# Patient Record
Sex: FEMALE | Race: WHITE | Employment: OTHER | URBAN - METROPOLITAN AREA
[De-identification: names, ages, dates, MRNs, and addresses within clinical notes are randomized per-mention and may not be internally consistent; named-entity substitution may affect disease eponyms.]

---

## 2017-05-18 ENCOUNTER — OP HISTORICAL/CONVERTED ENCOUNTER (OUTPATIENT)
Dept: OTHER | Age: 62
End: 2017-05-18

## 2020-12-16 ENCOUNTER — OFFICE VISIT (OUTPATIENT)
Dept: OBGYN CLINIC | Age: 65
End: 2020-12-16
Payer: COMMERCIAL

## 2020-12-16 VITALS
WEIGHT: 124 LBS | HEIGHT: 60 IN | DIASTOLIC BLOOD PRESSURE: 86 MMHG | BODY MASS INDEX: 24.35 KG/M2 | SYSTOLIC BLOOD PRESSURE: 134 MMHG

## 2020-12-16 DIAGNOSIS — Z12.72 SCREENING FOR MALIGNANT NEOPLASM OF VAGINA AFTER TOTAL HYSTERECTOMY: ICD-10-CM

## 2020-12-16 DIAGNOSIS — Z01.419 GYNECOLOGIC EXAM NORMAL: Primary | ICD-10-CM

## 2020-12-16 DIAGNOSIS — Z12.31 VISIT FOR SCREENING MAMMOGRAM: Primary | ICD-10-CM

## 2020-12-16 DIAGNOSIS — Z90.710 SCREENING FOR MALIGNANT NEOPLASM OF VAGINA AFTER TOTAL HYSTERECTOMY: ICD-10-CM

## 2020-12-16 PROCEDURE — 77067 SCR MAMMO BI INCL CAD: CPT | Performed by: OBSTETRICS & GYNECOLOGY

## 2020-12-16 PROCEDURE — 77063 BREAST TOMOSYNTHESIS BI: CPT | Performed by: OBSTETRICS & GYNECOLOGY

## 2020-12-16 PROCEDURE — 99386 PREV VISIT NEW AGE 40-64: CPT | Performed by: OBSTETRICS & GYNECOLOGY

## 2020-12-16 NOTE — PROGRESS NOTES
Risa Rios is a 60 y.o. female, No obstetric history on file., No LMP recorded. Patient has had a hysterectomy. , who presents today for the following:  Chief Complaint   Patient presents with    Annual Exam        Allergies   Allergen Reactions    Amoxicillin Other (comments)     Feels bad       Current Outpatient Medications   Medication Sig    NEBIVOLOL HCL (BYSTOLIC PO) Take  by mouth.  lisinopril-hydrochlorothiazide (PRINZIDE) 10-12.5 mg per tablet Take  by mouth daily.  calcium-vitamin D (CALCIUM+D) 500 mg(1,250mg) -200 unit per tablet Take 1 Tab by mouth two (2) times daily (with meals). No current facility-administered medications for this visit.         Past Medical History:   Diagnosis Date    Essential hypertension     Hyperlipidemia        Past Surgical History:   Procedure Laterality Date    HX PARTIAL HYSTERECTOMY         Family History   Problem Relation Age of Onset    Hypertension Mother     Heart Attack Brother     High Cholesterol Brother     Hypertension Brother        Social History     Socioeconomic History    Marital status:      Spouse name: Not on file    Number of children: Not on file    Years of education: Not on file    Highest education level: Not on file   Occupational History    Not on file   Social Needs    Financial resource strain: Not on file    Food insecurity     Worry: Not on file     Inability: Not on file    Transportation needs     Medical: Not on file     Non-medical: Not on file   Tobacco Use    Smoking status: Never Smoker    Smokeless tobacco: Never Used   Substance and Sexual Activity    Alcohol use: No    Drug use: No    Sexual activity: Yes     Partners: Male   Lifestyle    Physical activity     Days per week: Not on file     Minutes per session: Not on file    Stress: Not on file   Relationships    Social connections     Talks on phone: Not on file     Gets together: Not on file     Attends Holiness service: Not on file     Active member of club or organization: Not on file     Attends meetings of clubs or organizations: Not on file     Relationship status: Not on file    Intimate partner violence     Fear of current or ex partner: Not on file     Emotionally abused: Not on file     Physically abused: Not on file     Forced sexual activity: Not on file   Other Topics Concern    Not on file   Social History Narrative    Not on file         HPI  Annual    Review of Systems   Constitutional: Negative. Respiratory: Negative. Cardiovascular: Negative. Gastrointestinal: Negative. Genitourinary: Negative. Musculoskeletal: Negative. Skin: Negative. Neurological: Negative. Endo/Heme/Allergies: Negative. Psychiatric/Behavioral: Negative. All other systems reviewed and are negative. /86 (BP 1 Location: Left arm, BP Patient Position: Sitting)   Ht 5' (1.524 m)   Wt 124 lb (56.2 kg)   BMI 24.22 kg/m²    OBGyn Exam   Constitutional:     General Appearance: healthy-appearing, well-nourished, and well-developed;. Level of Distress: NAD. Ambulation: ambulating normally. Psychiatric:   Insight: good judgement. Mental Status: normal mood and affect and active and alert. Orientation: to time, place, and person. Memory: recent memory normal and remote memory normal.     Head: Head: normocephalic and atraumatic. Neck:   Neck: supple, FROM, trachea midline, and no masses. Lymph Nodes: no cervical LAD, supraclavicular LAD, axillary LAD, or inguinal LAD. Thyroid: no enlargement or nodules and non-tender. Lungs:   Respiratory effort: no dyspnea. Cardiovascular:     Pulses including femoral / pedal: normal throughout. Breast: Breast: no masses or abnormal secretions and normal appearance. Abdomen:    no tenderness, guarding, masses, rebound tenderness, or CVA tenderness and non-distended.      Female :   External genitalia: no lesions or rash and normal.   Vagina: moist mucosa;    Cervix: absent  Uterus: absent   Adnexae: no adnexal mass or tenderness and size WNL. Bladder and Urethra: normal bladder and urethra (except where noted). Musculoskeletal[de-identified]   Motor Strength and Tone: normal tone and motor strength. Joints, Bones, and Muscles: no contractures, malalignment, tenderness, or bony abnormalities and normal movement of all extremities. Extremities: no cyanosis, edema, varicosities, or palpable cord. Skin:   Inspection and palpation: no rash, lesions, ulcer, induration, nodules, jaundice, or abnormal nevi and good turgor. Nails: normal.     Back: Thoracolumbar Appearance: normal curvature. 1. Gynecologic exam normal    - PAP, LB, RFX HPV MRIQQ([id number]); Future    2.  Screening for malignant neoplasm of vagina after total hysterectomy          Follow-up and Dispositions    · Return in about 1 year (around 12/16/2021) for annual.

## 2020-12-21 LAB
CYTOLOGIST CVX/VAG CYTO: NORMAL
CYTOLOGY CVX/VAG DOC CYTO: NORMAL
DX ICD CODE: NORMAL
LABCORP, 190119: NORMAL
Lab: NORMAL
Lab: NORMAL
OTHER STN SPEC: NORMAL
STAT OF ADQ CVX/VAG CYTO-IMP: NORMAL

## 2021-11-30 ENCOUNTER — TRANSCRIBE ORDER (OUTPATIENT)
Dept: REGISTRATION | Age: 66
End: 2021-11-30

## 2021-11-30 ENCOUNTER — HOSPITAL ENCOUNTER (OUTPATIENT)
Dept: GENERAL RADIOLOGY | Age: 66
Discharge: HOME | End: 2021-11-30
Payer: COMMERCIAL

## 2021-11-30 DIAGNOSIS — I26.99 PULMONARY EMBOLISM (HCC): Primary | ICD-10-CM

## 2021-11-30 DIAGNOSIS — I26.99 PULMONARY EMBOLISM (HCC): ICD-10-CM

## 2021-11-30 PROCEDURE — 71046 X-RAY EXAM CHEST 2 VIEWS: CPT

## 2022-03-17 ENCOUNTER — TRANSCRIBE ORDER (OUTPATIENT)
Dept: SCHEDULING | Age: 67
End: 2022-03-17

## 2022-03-17 DIAGNOSIS — I10 HTN (HYPERTENSION): Primary | ICD-10-CM

## 2022-03-22 ENCOUNTER — HOSPITAL ENCOUNTER (OUTPATIENT)
Dept: NON INVASIVE DIAGNOSTICS | Age: 67
Discharge: HOME | End: 2022-03-22
Attending: FAMILY MEDICINE
Payer: MEDICARE

## 2022-03-22 DIAGNOSIS — I10 UNCONTROLLED HYPERTENSION: ICD-10-CM

## 2022-03-22 PROCEDURE — 93975 VASCULAR STUDY: CPT

## 2022-03-24 LAB
ABDOMINAL MID AORTA VEL: 110 CM/S
LEFT KIDNEY ARCUATE ARTERY INFERIOR EDV: 1.3 CM/S
LEFT KIDNEY ARCUATE ARTERY INFERIOR PSV: 24.4 CM/S
LEFT KIDNEY ARCUATE ARTERY MEDIAL EDV: 6.4 CM/S
LEFT KIDNEY ARCUATE ARTERY MEDIAL PSV: 27.9 CM/S
LEFT KIDNEY ARCUATE ARTERY SUPERIOR EDV: 4.6 CM/S
LEFT KIDNEY ARCUATE ARTERY SUPERIOR PSV: 21.4 CM/S
LEFT KIDNEY INF ARCUATE RI: 0.95
LEFT KIDNEY LENGTH: 9.33 CM
LEFT KIDNEY MED ARCUATE RI: 0.77
LEFT KIDNEY SUP ARCUATE RI: 0.79
LEFT RENAL DIST DIAS: 37.7 CM/S
LEFT RENAL DIST RI: 0.77
LEFT RENAL DIST SYS: 165 CM/S
LEFT RENAL LOWER PARENCHYMA MAX: 39.9 CM/S
LEFT RENAL LOWER PARENCHYMA MIN: 9 CM/S
LEFT RENAL LOWER PARENCHYMA RI: 0.77
LEFT RENAL MID DIAS: 27 CM/S
LEFT RENAL MID RI: 0.81
LEFT RENAL MID SYS: 139 CM/S
LEFT RENAL MIDDLE PARENCHYMA MAX: 41.2 CM/S
LEFT RENAL MIDDLE PARENCHYMA MIN: 6.9 CM/S
LEFT RENAL MIDDLE PARENCHYMA RI: 0.83
LEFT RENAL ORIGIN DIAS: 17.6 CM/S
LEFT RENAL ORIGIN RI: 0.85
LEFT RENAL ORIGIN SYS: 115 CM/S
LEFT RENAL PROX DIAS: 33.5 CM/S
LEFT RENAL PROX RI: 0.73
LEFT RENAL PROX SYS: 124 CM/S
LEFT RENAL UPPER PARENCHYMA MAX: 35.8 CM/S
LEFT RENAL UPPER PARENCHYMA MIN: 7.6 CM/S
LEFT RENAL UPPER PARENCHYMA RI: 0.79
PROX AORTIC AP: 1.13 CM
PROX AORTIC TRANS: 1.09 CM
RIGHT KIDNEY ARCUATE ARTERY INFERIOR EDV: 3.7 CM/S
RIGHT KIDNEY ARCUATE ARTERY INFERIOR PSV: 20.8 CM/S
RIGHT KIDNEY ARCUATE ARTERY MEDIAL EDV: 7.3 CM/S
RIGHT KIDNEY ARCUATE ARTERY MEDIAL PSV: 27.5 CM/S
RIGHT KIDNEY ARCUATE ARTERY SUPERIOR EDV: 0.8 CM/S
RIGHT KIDNEY ARCUATE ARTERY SUPERIOR PSV: 17.2 CM/S
RIGHT KIDNEY INF ARCUATE RI: 0.82
RIGHT KIDNEY LENGTH: 9.37 CM
RIGHT KIDNEY MED ARCUATE RI: 0.73
RIGHT KIDNEY SUP ARCUATE RI: 0.95
RIGHT RENAL DIST DIAS: 7.3 CM/S
RIGHT RENAL DIST RI: 0.92
RIGHT RENAL DIST SYS: 96.6 CM/S
RIGHT RENAL LOWER PARENCHYMA MAX: 21.7 CM/S
RIGHT RENAL LOWER PARENCHYMA MIN: 6.4 CM/S
RIGHT RENAL LOWER PARENCHYMA RI: 0.71
RIGHT RENAL MID DIAS: 47.4 CM/S
RIGHT RENAL MID RI: 0.72
RIGHT RENAL MID SYS: 171 CM/S
RIGHT RENAL MIDDLE PARENCHYMA MAX: 23.2 CM/S
RIGHT RENAL MIDDLE PARENCHYMA MIN: 4.6 CM/S
RIGHT RENAL MIDDLE PARENCHYMA RI: 0.8
RIGHT RENAL ORIGIN DIAS: 27.5 CM/S
RIGHT RENAL ORIGIN RI: 0.84
RIGHT RENAL ORIGIN SYS: 170 CM/S
RIGHT RENAL PROX DIAS: 40.3 CM/S
RIGHT RENAL PROX RI: 0.77
RIGHT RENAL PROX SYS: 175 CM/S
RIGHT RENAL UPPER PARENCHYMA MAX: 34 CM/S
RIGHT RENAL UPPER PARENCHYMA MIN: 9.2 CM/S
RIGHT RENAL UPPER PARENCHYMA RI: 0.73

## 2022-03-24 PROCEDURE — 93975 VASCULAR STUDY: CPT | Performed by: SURGERY

## 2022-05-12 ENCOUNTER — OFFICE VISIT (OUTPATIENT)
Dept: OBGYN CLINIC | Age: 67
End: 2022-05-12
Payer: MEDICARE

## 2022-05-12 VITALS
BODY MASS INDEX: 24.94 KG/M2 | SYSTOLIC BLOOD PRESSURE: 147 MMHG | OXYGEN SATURATION: 98 % | HEART RATE: 63 BPM | DIASTOLIC BLOOD PRESSURE: 77 MMHG | HEIGHT: 60 IN | WEIGHT: 127 LBS

## 2022-05-12 DIAGNOSIS — Z01.419 GYNECOLOGIC EXAM NORMAL: Primary | ICD-10-CM

## 2022-05-12 DIAGNOSIS — Z90.710 SCREENING FOR MALIGNANT NEOPLASM OF VAGINA AFTER TOTAL HYSTERECTOMY: ICD-10-CM

## 2022-05-12 DIAGNOSIS — Z12.72 SCREENING FOR MALIGNANT NEOPLASM OF VAGINA AFTER TOTAL HYSTERECTOMY: ICD-10-CM

## 2022-05-12 PROCEDURE — G0101 CA SCREEN;PELVIC/BREAST EXAM: HCPCS | Performed by: OBSTETRICS & GYNECOLOGY

## 2022-05-12 PROCEDURE — G8536 NO DOC ELDER MAL SCRN: HCPCS | Performed by: OBSTETRICS & GYNECOLOGY

## 2022-05-12 PROCEDURE — G8510 SCR DEP NEG, NO PLAN REQD: HCPCS | Performed by: OBSTETRICS & GYNECOLOGY

## 2022-05-12 PROCEDURE — G8420 CALC BMI NORM PARAMETERS: HCPCS | Performed by: OBSTETRICS & GYNECOLOGY

## 2022-05-12 PROCEDURE — G8427 DOCREV CUR MEDS BY ELIG CLIN: HCPCS | Performed by: OBSTETRICS & GYNECOLOGY

## 2022-05-12 RX ORDER — ROSUVASTATIN CALCIUM 10 MG/1
10 TABLET, COATED ORAL
COMMUNITY

## 2022-05-12 NOTE — PATIENT INSTRUCTIONS

## 2022-05-12 NOTE — PROGRESS NOTES
Олег Ocasio is a 73 y.o. female, , No LMP recorded. Patient has had a hysterectomy. , who presents today for the following:  Chief Complaint   Patient presents with    Annual Exam        Allergies   Allergen Reactions    Amoxicillin Other (comments)     Feels bad       Current Outpatient Medications   Medication Sig    apixaban (Eliquis) 5 mg tablet Take 5 mg by mouth two (2) times a day.  rosuvastatin (CRESTOR) 10 mg tablet Take 10 mg by mouth nightly.  NEBIVOLOL HCL (BYSTOLIC PO) Take  by mouth.  lisinopril-hydrochlorothiazide (PRINZIDE) 10-12.5 mg per tablet Take  by mouth daily.  calcium-vitamin D (CALCIUM+D) 500 mg(1,250mg) -200 unit per tablet Take 1 Tab by mouth two (2) times daily (with meals). (Patient not taking: Reported on 2022)     No current facility-administered medications for this visit.        Past Medical History:   Diagnosis Date    Deep vein thrombosis ([facility] [address]     Essential hypertension     Hyperlipidemia     Hypertension        Past Surgical History:   Procedure Laterality Date    HX PARTIAL HYSTERECTOMY         Family History   Problem Relation Age of Onset    Hypertension Mother     Heart Attack Brother     High Cholesterol Brother     Hypertension Brother        Social History     Socioeconomic History    Marital status:      Spouse name: Not on file    Number of children: Not on file    Years of education: Not on file    Highest education level: Not on file   Occupational History    Not on file   Tobacco Use    Smoking status: Never Smoker    Smokeless tobacco: Never Used   Substance and Sexual Activity    Alcohol use: No    Drug use: No    Sexual activity: Yes     Partners: Male   Other Topics Concern    Not on file   Social History Narrative    Not on file     Social Determinants of Health     Financial Resource Strain:     Difficulty of Paying Living Expenses: Not on file   Food Insecurity:     Worried About Running Out of [employer] in the Last Year: Not on file    Ran Out of Food in the Last Year: Not on file   Transportation Needs:     Lack of Transportation (Medical): Not on file    Lack of Transportation (Non-Medical): Not on file   Physical Activity:     Days of Exercise per Week: Not on file    Minutes of Exercise per Session: Not on file   Stress:     Feeling of Stress : Not on file   Social Connections:     Frequency of Communication with Friends and Family: Not on file    Frequency of Social Gatherings with Friends and Family: Not on file    Attends Episcopal Services: Not on file    Active Member of [address] [place] [address] [employer] or Organizations: Not on file    Attends Club or Organization Meetings: Not on file    Marital Status: Not on file   Intimate Partner Violence:     Fear of Current or Ex-Partner: Not on file    Emotionally Abused: Not on file    Physically Abused: Not on file    Sexually Abused: Not on file   Housing Stability:     Unable to Pay for Housing in the Last Year: Not on file    Number of [place] in the Last Year: Not on file    Unstable Housing in the Last Year: Not on file         HPI  Annual     Review of Systems   Constitutional: Negative. Respiratory: Negative. Cardiovascular: Negative. Gastrointestinal: Negative. Genitourinary: Negative. Musculoskeletal: Negative. Skin: Negative. Neurological: Negative. Endo/Heme/Allergies: Negative. Psychiatric/Behavioral: Negative. All other systems reviewed and are negative. BP (!) 147/77 (BP 1 Location: Left upper arm, BP Patient Position: Sitting)   Pulse 63   Ht 5' (1.524 m)   Wt 127 lb (57.6 kg)   SpO2 98%   BMI 24.80 kg/m²    OBGyn Exam   Constitutional:     General Appearance: healthy-appearing, well-nourished, and well-developed; Level of Distress: NAD. Ambulation: ambulating normally. Psychiatric:   Insight: good judgement. Mental Status: normal mood and affect and active and alert.    Orientation: to time, place, and person. Memory: recent memory normal and remote memory normal.     Head: Head: normocephalic and atraumatic. Neck:   Neck: supple, FROM, trachea midline, and no masses. Lymph Nodes: no cervical LAD, supraclavicular LAD, axillary LAD, or inguinal LAD. Thyroid: no enlargement or nodules and non-tender. Lungs:   Respiratory effort: no dyspnea. Cardiovascular:     Pulses including femoral / pedal: normal throughout. Breast: Breast: no masses or abnormal secretions and normal appearance. Abdomen:    no tenderness, guarding, masses, rebound tenderness, or CVA tenderness and non-distended. Female :   External genitalia: no lesions or rash and normal.   Vagina: moist mucosa; Adnexae: no adnexal mass or tenderness and size WNL. Bladder and Urethra: normal bladder and urethra (except where noted). .     1. Gynecologic exam normal    - PAP IG, RFX APTIMA HPV ASCUS ([id number])    2. Screening for malignant neoplasm of vagina after total hysterectomy          Follow-up and Dispositions    · Return in about 1 year (around 5/12/2023).

## 2022-05-16 LAB
CYTOLOGIST CVX/VAG CYTO: NORMAL
CYTOLOGY CVX/VAG DOC CYTO: NORMAL
CYTOLOGY CVX/VAG DOC THIN PREP: NORMAL
DX ICD CODE: NORMAL
LABCORP, 190119: NORMAL
Lab: NORMAL
OTHER STN SPEC: NORMAL
STAT OF ADQ CVX/VAG CYTO-IMP: NORMAL

## 2022-08-25 ENCOUNTER — OFFICE VISIT (OUTPATIENT)
Dept: OBGYN CLINIC | Age: 67
End: 2022-08-25
Payer: MEDICARE

## 2022-08-25 VITALS
HEIGHT: 60 IN | OXYGEN SATURATION: 99 % | WEIGHT: 120 LBS | RESPIRATION RATE: 16 BRPM | HEART RATE: 68 BPM | SYSTOLIC BLOOD PRESSURE: 156 MMHG | BODY MASS INDEX: 23.56 KG/M2 | DIASTOLIC BLOOD PRESSURE: 81 MMHG

## 2022-08-25 DIAGNOSIS — N76.0 VAGINITIS AND VULVOVAGINITIS: Primary | ICD-10-CM

## 2022-08-25 PROCEDURE — G8427 DOCREV CUR MEDS BY ELIG CLIN: HCPCS | Performed by: OBSTETRICS & GYNECOLOGY

## 2022-08-25 PROCEDURE — G8536 NO DOC ELDER MAL SCRN: HCPCS | Performed by: OBSTETRICS & GYNECOLOGY

## 2022-08-25 PROCEDURE — 99213 OFFICE O/P EST LOW 20 MIN: CPT | Performed by: OBSTETRICS & GYNECOLOGY

## 2022-08-25 PROCEDURE — 3017F COLORECTAL CA SCREEN DOC REV: CPT | Performed by: OBSTETRICS & GYNECOLOGY

## 2022-08-25 PROCEDURE — G8420 CALC BMI NORM PARAMETERS: HCPCS | Performed by: OBSTETRICS & GYNECOLOGY

## 2022-08-25 PROCEDURE — G8753 SYS BP > OR = 140: HCPCS | Performed by: OBSTETRICS & GYNECOLOGY

## 2022-08-25 PROCEDURE — 1101F PT FALLS ASSESS-DOCD LE1/YR: CPT | Performed by: OBSTETRICS & GYNECOLOGY

## 2022-08-25 PROCEDURE — G9899 SCRN MAM PERF RSLTS DOC: HCPCS | Performed by: OBSTETRICS & GYNECOLOGY

## 2022-08-25 PROCEDURE — G8400 PT W/DXA NO RESULTS DOC: HCPCS | Performed by: OBSTETRICS & GYNECOLOGY

## 2022-08-25 PROCEDURE — G8754 DIAS BP LESS 90: HCPCS | Performed by: OBSTETRICS & GYNECOLOGY

## 2022-08-25 PROCEDURE — 1090F PRES/ABSN URINE INCON ASSESS: CPT | Performed by: OBSTETRICS & GYNECOLOGY

## 2022-08-25 PROCEDURE — G8510 SCR DEP NEG, NO PLAN REQD: HCPCS | Performed by: OBSTETRICS & GYNECOLOGY

## 2022-08-25 PROCEDURE — 1123F ACP DISCUSS/DSCN MKR DOCD: CPT | Performed by: OBSTETRICS & GYNECOLOGY

## 2022-08-25 RX ORDER — CLOTRIMAZOLE AND BETAMETHASONE DIPROPIONATE 10; .64 MG/G; MG/G
CREAM TOPICAL 2 TIMES DAILY
Qty: 15 G | Refills: 0 | Status: SHIPPED | OUTPATIENT
Start: 2022-08-25

## 2022-08-25 RX ORDER — BISMUTH SUBSALICYLATE 262 MG
1 TABLET,CHEWABLE ORAL DAILY
COMMUNITY

## 2022-08-25 NOTE — PROGRESS NOTES
Keya Waters is a 78 y.o. female, , No LMP recorded. Patient has had a hysterectomy. , who presents today for the following:  Chief Complaint   Patient presents with    Vaginitis        Allergies   Allergen Reactions    Amoxicillin Other (comments)     Feels bad       Current Outpatient Medications   Medication Sig    multivitamin (ONE A DAY) tablet Take 1 Tablet by mouth daily.  clotrimazole-betamethasone (LOTRISONE) topical cream Apply  to affected area two (2) times a day.  apixaban (ELIQUIS) 5 mg tablet Take 5 mg by mouth two (2) times a day.  rosuvastatin (CRESTOR) 10 mg tablet Take 10 mg by mouth nightly.  NEBIVOLOL HCL (BYSTOLIC PO) Take  by mouth.  lisinopril-hydroCHLOROthiazide (PRINZIDE, ZESTORETIC) 10-12.5 mg per tablet Take  by mouth daily.  calcium-vitamin D (OS-HANNAH +D3) 500 mg-200 unit per tablet Take 1 Tab by mouth two (2) times daily (with meals). (Patient not taking: No sig reported)     No current facility-administered medications for this visit.        Past Medical History:   Diagnosis Date    Deep vein thrombosis ([facility] [address]     Essential hypertension     Hyperlipidemia     Hypertension        Past Surgical History:   Procedure Laterality Date    HX PARTIAL HYSTERECTOMY         Family History   Problem Relation Age of Onset    Hypertension Mother     Heart Attack Brother     High Cholesterol Brother     Hypertension Brother        Social History     Socioeconomic History    Marital status:      Spouse name: Not on file    Number of children: Not on file    Years of education: Not on file    Highest education level: Not on file   Occupational History    Not on file   Tobacco Use    Smoking status: Never    Smokeless tobacco: Never   Substance and Sexual Activity    Alcohol use: No    Drug use: No    Sexual activity: Yes     Partners: Male   Other Topics Concern    Not on file   Social History Narrative    Not on file     Social Determinants of Health     Financial Resource Strain: Not on file   Food Insecurity: Not on file   Transportation Needs: Not on file   Physical Activity: Not on file   Stress: Not on file   Social Connections: Not on file   Intimate Partner Violence: Not on file   Housing Stability: Not on file         Vaginitis   Vaginal Discharge   Reported by patient. Location: vagina   Quality: irritation  Severity: moderate   Duration: symptoms lasting over 1 month, recent use of antibiotics  Context: antibiotic use , has not seen actual discharge  Associated Symptoms: no vaginal burning; no swelling/redness; no fever/chills; no diarrhea; no abdominal pain; no pelvic pain; no vaginal pain; no pain during urination; no pain during intercourse; no vaginal lump; no genital lesion; no sexually transmitted disease; no fever; +vaginal itching       Review of Systems   Constitutional: Negative. Respiratory: Negative. Cardiovascular: Negative. Gastrointestinal: Negative. Genitourinary:  Positive for vaginal discharge. Musculoskeletal: Negative. Skin: Negative. Neurological: Negative. Endo/Heme/Allergies: Negative. Psychiatric/Behavioral: Negative. All other systems reviewed and are negative. BP (!) 156/81 (BP 1 Location: Left upper arm)   Pulse 68   Resp 16   Ht 5' (1.524 m)   Wt 120 lb (54.4 kg)   SpO2 99%   BMI 23.44 kg/m²    OBGyn Exam     PE:  Constitutional: General Appearance: healthy-appearing, well-nourished, well-developed, and well groomed. Psychiatric: Orientation: to time, place, and person. Mood and Affect: normal mood and affect and appropriate and active and alert. Abdomen: Auscultation/Inspection/Palpation: no tenderness and mass and non-distended. Hernia: none palpated. Female Genitalia: Vulva: no masses, atrophy, or lesions. Bladder/Urethra: no urethral discharge or mass and normal meatus and bladder non distended.      Vagina no tenderness, erythema, cystocele, rectocele, abnormal vaginal discharge, or vesicle(s) or ulcers. Adnexa/Parametria: no adnexal tenderness. 1. Vaginitis and vulvovaginitis    - clotrimazole-betamethasone (LOTRISONE) topical cream; Apply  to affected area two (2) times a day. Dispense: 15 g; Refill: 0  - NUSWAB VAGINITIS PLUS (VG+) WITH CANDIDA (SIX SPECIES)        Follow-up and Dispositions    Return for gyn.

## 2022-08-28 LAB
A VAGINAE DNA VAG QL NAA+PROBE: NORMAL SCORE
BVAB2 DNA VAG QL NAA+PROBE: NORMAL SCORE
C ALBICANS DNA VAG QL NAA+PROBE: NEGATIVE
C GLABRATA DNA VAG QL NAA+PROBE: NEGATIVE
C KRUSEI DNA VAG QL NAA+PROBE: NEGATIVE
C LUSITANIAE DNA VAG QL NAA+PROBE: NEGATIVE
C TRACH DNA VAG QL NAA+PROBE: NEGATIVE
CANDIDA DNA VAG QL NAA+PROBE: NEGATIVE
MEGA1 DNA VAG QL NAA+PROBE: NORMAL SCORE
N GONORRHOEA DNA VAG QL NAA+PROBE: NEGATIVE
T VAGINALIS DNA VAG QL NAA+PROBE: NEGATIVE

## 2023-04-26 ENCOUNTER — TELEPHONE (OUTPATIENT)
Dept: OBGYN CLINIC | Age: 68
End: 2023-04-26

## 2023-04-26 NOTE — TELEPHONE ENCOUNTER
Called patient back from her voicemail message left. patient was unable to reach left a message on patient phone.

## 2023-05-24 RX ORDER — CLOTRIMAZOLE AND BETAMETHASONE DIPROPIONATE 10; .64 MG/G; MG/G
CREAM TOPICAL 2 TIMES DAILY
COMMUNITY
Start: 2022-08-25

## 2023-05-24 RX ORDER — ROSUVASTATIN CALCIUM 10 MG/1
10 TABLET, COATED ORAL NIGHTLY
COMMUNITY

## 2023-05-24 RX ORDER — LISINOPRIL AND HYDROCHLOROTHIAZIDE 12.5; 1 MG/1; MG/1
TABLET ORAL DAILY
COMMUNITY

## 2023-08-17 ENCOUNTER — TELEPHONE (OUTPATIENT)
Age: 68
End: 2023-08-17

## 2023-08-17 NOTE — TELEPHONE ENCOUNTER
Called and left a voicemail on 08/17 at 11:13 AM. Patient would like to know when she last her last mammogram.     I returned the patients call on 08/17 at 2:32 PM. Left a voicemail to please return our call.

## 2023-08-28 ENCOUNTER — TELEPHONE (OUTPATIENT)
Age: 68
End: 2023-08-28

## 2023-08-28 NOTE — TELEPHONE ENCOUNTER
Called and left a voicemail on 08/28 at 11:39 AM. Needs to cancel her appointment with Dr. Leonard Barcenas on 09/11. I returned the patients call on 08/28 at 1:00 PM. Left a voicemail to please return our call if she needs to reschedule. Appointment is being cancelled and notes or on the appointment.